# Patient Record
Sex: FEMALE | Race: WHITE | NOT HISPANIC OR LATINO | URBAN - METROPOLITAN AREA
[De-identification: names, ages, dates, MRNs, and addresses within clinical notes are randomized per-mention and may not be internally consistent; named-entity substitution may affect disease eponyms.]

---

## 2018-09-07 ENCOUNTER — EMERGENCY (EMERGENCY)
Facility: HOSPITAL | Age: 48
LOS: 1 days | End: 2018-09-07
Payer: MEDICARE

## 2018-09-07 PROCEDURE — 99284 EMERGENCY DEPT VISIT MOD MDM: CPT

## 2020-01-31 ENCOUNTER — OUTPATIENT (OUTPATIENT)
Dept: OUTPATIENT SERVICES | Facility: HOSPITAL | Age: 50
LOS: 1 days | End: 2020-01-31

## 2020-03-11 ENCOUNTER — EMERGENCY (EMERGENCY)
Facility: HOSPITAL | Age: 50
LOS: 1 days | End: 2020-03-11
Admitting: EMERGENCY MEDICINE
Payer: MEDICARE

## 2020-03-11 PROCEDURE — 99283 EMERGENCY DEPT VISIT LOW MDM: CPT

## 2020-07-30 ENCOUNTER — OUTPATIENT (OUTPATIENT)
Dept: OUTPATIENT SERVICES | Facility: HOSPITAL | Age: 50
LOS: 1 days | End: 2020-07-30

## 2020-08-06 ENCOUNTER — RESULT REVIEW (OUTPATIENT)
Age: 50
End: 2020-08-06

## 2020-08-14 PROBLEM — Z00.00 ENCOUNTER FOR PREVENTIVE HEALTH EXAMINATION: Status: ACTIVE | Noted: 2020-08-14

## 2020-09-16 ENCOUNTER — APPOINTMENT (OUTPATIENT)
Dept: INTERNAL MEDICINE | Facility: CLINIC | Age: 50
End: 2020-09-16
Payer: COMMERCIAL

## 2020-09-16 ENCOUNTER — APPOINTMENT (OUTPATIENT)
Dept: INTERNAL MEDICINE | Facility: CLINIC | Age: 50
End: 2020-09-16

## 2020-09-16 VITALS — BODY MASS INDEX: 28.12 KG/M2 | WEIGHT: 175 LBS | HEIGHT: 66 IN

## 2020-09-16 PROCEDURE — 99204 OFFICE O/P NEW MOD 45 MIN: CPT

## 2020-09-16 NOTE — ASSESSMENT
[FreeTextEntry1] : #1  Status post thoracentesis for right pleural effusion in early August.  No history of recurrence.  Pt lives in a group home and is followed by Dr. Pickering ([phone]) there.  If she develops any symptoms or any signs suggestive of a possible pleural effusion she will have a repeat chest x-ray. Otherwise if she continues to do well, she will return for a following appointment in 3 months.\par \par #2 Dementia.\par \par #3 Down's syndrome.\par \par #4 Myclonic seizure d/o.

## 2020-09-16 NOTE — HISTORY OF PRESENT ILLNESS
[TextBox_4] : The first pulmonary appointment for this 50-year-old female with a history of Down syndrome, myoclonic seizure disorder, and dementia.  She is here today with her mother.  The patient has a history of having a seizure and fell hit her head and was admitted to [facility] in early August.  She was noted to have a right pleural effusion which was drained.  This was clear yellow with cytology negative.  A chest x-ray after the thoracentesis showed resolution of the fluid.  Patient is not having any coughing, wheezing, or shortness of breath.  No fever or chills.  He is a non-smoker.

## 2020-09-16 NOTE — REVIEW OF SYSTEMS
[Fever] : no fever [Chills] : no chills [Cough] : no cough [Sputum] : no sputum [Wheezing] : no wheezing [SOB on Exertion] : no sob on exertion [Negative] : Endocrine [TextBox_122] : see above

## 2020-09-16 NOTE — PHYSICAL EXAM
[Normal Appearance] : normal appearance [Normal Oropharynx] : normal oropharynx [No Acute Distress] : no acute distress [No Neck Mass] : no neck mass [Normal Rate/Rhythm] : normal rate/rhythm [No Murmurs] : no murmurs [Normal S1, S2] : normal s1, s2 [No Resp Distress] : no resp distress [Clear to Auscultation Bilaterally] : clear to auscultation bilaterally [Benign] : benign [No Abnormalities] : no abnormalities [No Clubbing] : no clubbing [Normal Gait] : normal gait [No Cyanosis] : no cyanosis [No Edema] : no edema [FROM] : FROM [Normal Color/ Pigmentation] : normal color/ pigmentation [TextBox_132] : In WC

## 2020-12-14 ENCOUNTER — APPOINTMENT (OUTPATIENT)
Dept: INTERNAL MEDICINE | Facility: CLINIC | Age: 50
End: 2020-12-14
Payer: MEDICARE

## 2020-12-14 VITALS
WEIGHT: 180 LBS | OXYGEN SATURATION: 94 % | HEART RATE: 148 BPM | RESPIRATION RATE: 18 BRPM | HEIGHT: 70 IN | BODY MASS INDEX: 25.77 KG/M2 | TEMPERATURE: 96.8 F

## 2020-12-14 PROCEDURE — 99214 OFFICE O/P EST MOD 30 MIN: CPT

## 2020-12-14 NOTE — ASSESSMENT
[FreeTextEntry1] : #1  Status post right pleural effusion in August 2020.  No history of recurrence.  Patient lives in a group home.  and is followed by Dr. Pickering there.  Id she has signs or symptoms of a possible recurrent pleural effusion, she will have a repeat chest x-ray.  Otherwise, plan for following pulmonary appointment in 1 year.

## 2020-12-14 NOTE — HISTORY OF PRESENT ILLNESS
[TextBox_4] : This is a return pulmonary appointment for this 50-year-old female who is accompanied by her aide today.  Patient has a history of Down syndrome, dementia, hypothyroidism.  She was admitted to [facility] in August of this year after falling and hitting her head.  She was noted to have a right pleural effusion.  This was drained and was yellow and clear, cytology was negative.  Chest x-ray after showed no pleural effusion.\par \par The patient is not noted to have coughing, wheezing, shortness of breath, or sputum production.  Not having fever or chills.

## 2020-12-14 NOTE — PHYSICAL EXAM
[No Acute Distress] : no acute distress [Normal Oropharynx] : normal oropharynx [Normal Appearance] : normal appearance [No Neck Mass] : no neck mass [Normal Rate/Rhythm] : normal rate/rhythm [Normal S1, S2] : normal s1, s2 [No Resp Distress] : no resp distress [Clear to Auscultation Bilaterally] : clear to auscultation bilaterally [No Abnormalities] : no abnormalities [Benign] : benign [Normal Gait] : normal gait [No Clubbing] : no clubbing [No Cyanosis] : no cyanosis [No Edema] : no edema [Normal Color/ Pigmentation] : normal color/ pigmentation [No Focal Deficits] : no focal deficits [Oriented x3] : oriented x3 [Normal Affect] : normal affect [TextBox_54] : HR 80

## 2021-08-25 ENCOUNTER — APPOINTMENT (OUTPATIENT)
Dept: INTERNAL MEDICINE | Facility: CLINIC | Age: 51
End: 2021-08-25
Payer: MEDICARE

## 2021-08-25 VITALS
RESPIRATION RATE: 18 BRPM | BODY MASS INDEX: 25.77 KG/M2 | WEIGHT: 180 LBS | OXYGEN SATURATION: 99 % | SYSTOLIC BLOOD PRESSURE: 110 MMHG | HEIGHT: 70 IN | HEART RATE: 68 BPM | DIASTOLIC BLOOD PRESSURE: 60 MMHG | TEMPERATURE: 96.9 F

## 2021-08-25 PROCEDURE — 99214 OFFICE O/P EST MOD 30 MIN: CPT

## 2021-08-25 NOTE — ASSESSMENT
[FreeTextEntry1] : #1  History of right pleural effusion August 2020.  This was drained at that time.  Fluid was clear yellow and cytology was negative.  Chest x-ray after showed resolution of the effusion.  Patient lives in a group home and is followed by Dr. Pickering there.  If she has symptoms or signs of a possible recurrent effusion, she will have a repeat chest x-ray.  Otherwise, she will return for following appointment in 1 year.

## 2021-08-25 NOTE — HISTORY OF PRESENT ILLNESS
[TextBox_4] : This is a return visit for this 51-year-old female who is accompanied by her aide Elsa today.  She lives in a group home.  She has a history of Down's syndrome, dementia, seizure disorder, hypothyroidism.  She had a right pleural effusion that was drained August, 2020.  There is no history of known recurrence.  She is not having recent coughing, wheezing, shortness of breath, fever, or chills.

## 2021-08-25 NOTE — PHYSICAL EXAM
[No Acute Distress] : no acute distress [Normal Appearance] : normal appearance [No Neck Mass] : no neck mass [Normal Rate/Rhythm] : normal rate/rhythm [Normal S1, S2] : normal s1, s2 [No Murmurs] : no murmurs [No Resp Distress] : no resp distress [Clear to Auscultation Bilaterally] : clear to auscultation bilaterally [No Abnormalities] : no abnormalities [Benign] : benign [No Clubbing] : no clubbing [No Cyanosis] : no cyanosis [No Edema] : no edema [Normal Color/ Pigmentation] : normal color/ pigmentation

## 2021-10-07 ENCOUNTER — NON-APPOINTMENT (OUTPATIENT)
Age: 51
End: 2021-10-07

## 2021-10-07 ENCOUNTER — APPOINTMENT (OUTPATIENT)
Dept: OPHTHALMOLOGY | Facility: CLINIC | Age: 51
End: 2021-10-07
Payer: MEDICARE

## 2021-10-07 PROCEDURE — 99072 ADDL SUPL MATRL&STAF TM PHE: CPT

## 2021-10-07 PROCEDURE — 92014 COMPRE OPH EXAM EST PT 1/>: CPT

## 2022-06-07 ENCOUNTER — OUTPATIENT (OUTPATIENT)
Dept: OUTPATIENT SERVICES | Facility: HOSPITAL | Age: 52
LOS: 1 days | End: 2022-06-07

## 2022-06-07 DIAGNOSIS — J11.1 INFLUENZA DUE TO UNIDENTIFIED INFLUENZA VIRUS WITH OTHER RESPIRATORY MANIFESTATIONS: ICD-10-CM

## 2022-10-13 ENCOUNTER — NON-APPOINTMENT (OUTPATIENT)
Age: 52
End: 2022-10-13

## 2022-10-13 ENCOUNTER — APPOINTMENT (OUTPATIENT)
Dept: OPHTHALMOLOGY | Facility: CLINIC | Age: 52
End: 2022-10-13

## 2022-10-13 PROCEDURE — 92014 COMPRE OPH EXAM EST PT 1/>: CPT

## 2022-11-30 ENCOUNTER — APPOINTMENT (OUTPATIENT)
Dept: INTERNAL MEDICINE | Facility: CLINIC | Age: 52
End: 2022-11-30

## 2022-11-30 VITALS
BODY MASS INDEX: 20.04 KG/M2 | OXYGEN SATURATION: 94 % | WEIGHT: 140 LBS | HEIGHT: 70 IN | RESPIRATION RATE: 18 BRPM | HEART RATE: 74 BPM | DIASTOLIC BLOOD PRESSURE: 60 MMHG | SYSTOLIC BLOOD PRESSURE: 100 MMHG

## 2022-11-30 DIAGNOSIS — Q90.9 DOWN SYNDROME, UNSPECIFIED: ICD-10-CM

## 2022-11-30 PROCEDURE — 99214 OFFICE O/P EST MOD 30 MIN: CPT

## 2022-11-30 NOTE — ASSESSMENT
[FreeTextEntry1] : #1  52-year-old female with Down syndrome, dementia, seizure disorder, with history of right pleural effusion August 2020.  This was drained.  There is no history of recurrence.  She will return for a following pulmonary appointment in 1 year or at anytime on an as-needed basis.

## 2022-11-30 NOTE — HISTORY OF PRESENT ILLNESS
[TextBox_4] : Patient is accompanied by 2 people today who work at her residence..  \par \par This is a return pulmonary appointment for this 52-year-old female with a history of Down syndrome, dementia, seizure disorder, hypothyroidism.  She had a light right pleural effusion in August 2020 which was drained.  There is no history of recurrence.  Patient is not having coughing, wheezing, shortness of breath, fever, or chills.

## 2023-02-13 ENCOUNTER — APPOINTMENT (OUTPATIENT)
Dept: INTERNAL MEDICINE | Facility: CLINIC | Age: 53
End: 2023-02-13

## 2023-03-29 ENCOUNTER — OUTPATIENT (OUTPATIENT)
Dept: OUTPATIENT SERVICES | Facility: HOSPITAL | Age: 53
LOS: 1 days | End: 2023-03-29
Payer: MEDICARE

## 2023-03-29 VITALS
DIASTOLIC BLOOD PRESSURE: 70 MMHG | HEART RATE: 83 BPM | HEIGHT: 68 IN | OXYGEN SATURATION: 99 % | TEMPERATURE: 97 F | WEIGHT: 142.86 LBS | SYSTOLIC BLOOD PRESSURE: 106 MMHG | RESPIRATION RATE: 20 BRPM

## 2023-03-29 DIAGNOSIS — K02.9 DENTAL CARIES, UNSPECIFIED: ICD-10-CM

## 2023-03-29 DIAGNOSIS — K02.62 DENTAL CARIES ON SMOOTH SURFACE PENETRATING INTO DENTIN: ICD-10-CM

## 2023-03-29 DIAGNOSIS — K05.6 PERIODONTAL DISEASE, UNSPECIFIED: ICD-10-CM

## 2023-03-29 DIAGNOSIS — Z01.818 ENCOUNTER FOR OTHER PREPROCEDURAL EXAMINATION: ICD-10-CM

## 2023-03-29 DIAGNOSIS — Z93.1 GASTROSTOMY STATUS: Chronic | ICD-10-CM

## 2023-03-29 DIAGNOSIS — Z98.890 OTHER SPECIFIED POSTPROCEDURAL STATES: Chronic | ICD-10-CM

## 2023-03-29 LAB
ANION GAP SERPL CALC-SCNC: 11 MMOL/L — SIGNIFICANT CHANGE UP (ref 5–17)
BUN SERPL-MCNC: 20 MG/DL — SIGNIFICANT CHANGE UP (ref 7–23)
CALCIUM SERPL-MCNC: 10.1 MG/DL — SIGNIFICANT CHANGE UP (ref 8.4–10.5)
CHLORIDE SERPL-SCNC: 103 MMOL/L — SIGNIFICANT CHANGE UP (ref 96–108)
CO2 SERPL-SCNC: 29 MMOL/L — SIGNIFICANT CHANGE UP (ref 22–31)
CREAT SERPL-MCNC: 0.72 MG/DL — SIGNIFICANT CHANGE UP (ref 0.5–1.3)
EGFR: 99 ML/MIN/1.73M2 — SIGNIFICANT CHANGE UP
GLUCOSE SERPL-MCNC: 84 MG/DL — SIGNIFICANT CHANGE UP (ref 70–99)
HCT VFR BLD CALC: 37.6 % — SIGNIFICANT CHANGE UP (ref 34.5–45)
HGB BLD-MCNC: 11.8 G/DL — SIGNIFICANT CHANGE UP (ref 11.5–15.5)
MCHC RBC-ENTMCNC: 31.4 GM/DL — LOW (ref 32–36)
MCHC RBC-ENTMCNC: 32 PG — SIGNIFICANT CHANGE UP (ref 27–34)
MCV RBC AUTO: 101.9 FL — HIGH (ref 80–100)
NRBC # BLD: 0 /100 WBCS — SIGNIFICANT CHANGE UP (ref 0–0)
PLATELET # BLD AUTO: 219 K/UL — SIGNIFICANT CHANGE UP (ref 150–400)
POTASSIUM SERPL-MCNC: 4 MMOL/L — SIGNIFICANT CHANGE UP (ref 3.5–5.3)
POTASSIUM SERPL-SCNC: 4 MMOL/L — SIGNIFICANT CHANGE UP (ref 3.5–5.3)
RBC # BLD: 3.69 M/UL — LOW (ref 3.8–5.2)
RBC # FLD: 13.6 % — SIGNIFICANT CHANGE UP (ref 10.3–14.5)
SODIUM SERPL-SCNC: 143 MMOL/L — SIGNIFICANT CHANGE UP (ref 135–145)
WBC # BLD: 2.84 K/UL — LOW (ref 3.8–10.5)
WBC # FLD AUTO: 2.84 K/UL — LOW (ref 3.8–10.5)

## 2023-03-29 PROCEDURE — 71045 X-RAY EXAM CHEST 1 VIEW: CPT | Mod: 26

## 2023-03-29 PROCEDURE — G0463: CPT

## 2023-03-29 PROCEDURE — 71045 X-RAY EXAM CHEST 1 VIEW: CPT

## 2023-03-29 RX ORDER — LEVOTHYROXINE SODIUM 125 MCG
1 TABLET ORAL
Refills: 0 | DISCHARGE

## 2023-03-29 RX ORDER — LACOSAMIDE 50 MG/1
1 TABLET ORAL
Refills: 0 | DISCHARGE

## 2023-03-29 RX ORDER — SENNA PLUS 8.6 MG/1
0 TABLET ORAL
Refills: 0 | DISCHARGE

## 2023-03-29 RX ORDER — MODAFINIL 200 MG/1
1 TABLET ORAL
Refills: 0 | DISCHARGE

## 2023-03-29 RX ORDER — OMEPRAZOLE 10 MG/1
1 CAPSULE, DELAYED RELEASE ORAL
Refills: 0 | DISCHARGE

## 2023-03-29 RX ORDER — MIDAZOLAM HYDROCHLORIDE 1 MG/ML
1 INJECTION, SOLUTION INTRAMUSCULAR; INTRAVENOUS
Refills: 0 | DISCHARGE

## 2023-03-29 RX ORDER — MIDODRINE HYDROCHLORIDE 2.5 MG/1
1 TABLET ORAL
Refills: 0 | DISCHARGE

## 2023-03-29 RX ORDER — LAMOTRIGINE 25 MG/1
2.5 TABLET, ORALLY DISINTEGRATING ORAL
Refills: 0 | DISCHARGE

## 2023-03-29 NOTE — H&P PST ADULT - NSANTHOSAYNRD_GEN_A_CORE
per group home staff/No. JOYCE screening performed.  STOP BANG Legend: 0-2 = LOW Risk; 3-4 = INTERMEDIATE Risk; 5-8 = HIGH Risk

## 2023-03-29 NOTE — H&P PST ADULT - NSICDXPASTMEDICALHX_GEN_ALL_CORE_FT
PAST MEDICAL HISTORY:  At risk for aspiration     Down syndrome     Bradley catheter present     History of contracture of joint     History of pleural effusion     History of seizure disorder     Hypertension     Hypothyroidism     Involuntary jerky movements     Risk for falls     Wheelchair dependent

## 2023-03-29 NOTE — H&P PST ADULT - PROBLEM SELECTOR PLAN 1
Scheduled for comprehensive dental treatment under general anesthesia   preop instruction provided Scheduled for comprehensive dental treatment under general anesthesia   preop instruction provided  group home staff

## 2023-03-29 NOTE — H&P PST ADULT - HISTORY OF PRESENT ILLNESS
53 year old non verbal wheelchair bound female accompanied with group home staff, presents for preop testing for scheduled comprehensive dental treatment under general anesthesia on 04/19/2023. Her history significant for admission at [facility] 1/2023 for pericardial effusion s/p pericardiocentesis, sacral decubitus, urinary catheter in place x3 weeks (to help with decubitus healing per staff), Peg tube, h/o aspiration pneumonia in the past, Epilepsy (unknown last seizure episode), frequent jerky movement, hypothyroidism, HTN, down syndrome, upper and lower extremities contractures, cervical subluxation, intellectual disabilities, fall risk, head injury 53 year old non verbal wheelchair bound female accompanied with group home staff, presents for preop testing for scheduled comprehensive dental treatment under general anesthesia on 04/19/2023. Her history significant for admission at [facility] 1 for pericardial effusion s/p pericardiocentesis 1/2023, sacral decubitus, urinary catheter in place x3 weeks (to help with buttock wound healing per staff), Peg tube, h/o aspiration pneumonia in the past, Epilepsy (unknown last seizure episode), frequent jerky movement, hypothyroidism, HTN, down syndrome, upper and lower extremities contractures, cervical subluxation, intellectual disabilities, fall risk, head injury in the past.

## 2023-03-29 NOTE — H&P PST ADULT - RESPIRATORY
Medical Week 4 Survey      Responses   Facility patient discharged from?  [facility]   Does the patient have one of the following disease processes/diagnoses(primary or secondary)?  Other   Week 4 attempt successful?  No          Rabia Saini RN   no respiratory distress/good air movement

## 2023-03-29 NOTE — H&P PST ADULT - NSICDXPASTSURGICALHX_GEN_ALL_CORE_FT
PAST SURGICAL HISTORY:  PEG (percutaneous endoscopic gastrostomy) status     S/P pericardiocentesis

## 2023-03-29 NOTE — H&P PST ADULT - NS PRO AD ANY ON CHART
Received pt as transfer from [facility] this evening. Pt confused, disoriented. Able to follow minimal commands. Moves all extremities. Restless and attempting to pull at lines. Mittens applied per order.   SR on tele, RA during day  Ordoñez in place  Abx and IV No

## 2023-07-24 PROBLEM — Z87.39 PERSONAL HISTORY OF OTHER DISEASES OF THE MUSCULOSKELETAL SYSTEM AND CONNECTIVE TISSUE: Chronic | Status: ACTIVE | Noted: 2023-03-29

## 2023-07-24 PROBLEM — Z97.8 PRESENCE OF OTHER SPECIFIED DEVICES: Chronic | Status: ACTIVE | Noted: 2023-03-29

## 2023-07-24 PROBLEM — Z87.09 PERSONAL HISTORY OF OTHER DISEASES OF THE RESPIRATORY SYSTEM: Chronic | Status: ACTIVE | Noted: 2023-03-29

## 2023-07-24 PROBLEM — I10 ESSENTIAL (PRIMARY) HYPERTENSION: Chronic | Status: ACTIVE | Noted: 2023-03-29

## 2023-07-24 PROBLEM — Z91.81 HISTORY OF FALLING: Chronic | Status: ACTIVE | Noted: 2023-03-29

## 2023-07-24 PROBLEM — Z99.3 DEPENDENCE ON WHEELCHAIR: Chronic | Status: ACTIVE | Noted: 2023-03-29

## 2023-07-24 PROBLEM — Z86.69 PERSONAL HISTORY OF OTHER DISEASES OF THE NERVOUS SYSTEM AND SENSE ORGANS: Chronic | Status: ACTIVE | Noted: 2023-03-29

## 2023-07-24 PROBLEM — E03.9 HYPOTHYROIDISM, UNSPECIFIED: Chronic | Status: ACTIVE | Noted: 2023-03-29

## 2023-07-24 PROBLEM — Q90.9 DOWN SYNDROME, UNSPECIFIED: Chronic | Status: ACTIVE | Noted: 2023-03-29

## 2023-07-24 PROBLEM — Z91.89 OTHER SPECIFIED PERSONAL RISK FACTORS, NOT ELSEWHERE CLASSIFIED: Chronic | Status: ACTIVE | Noted: 2023-03-29

## 2023-07-24 PROBLEM — R25.8 OTHER ABNORMAL INVOLUNTARY MOVEMENTS: Chronic | Status: ACTIVE | Noted: 2023-03-29

## 2023-11-22 ENCOUNTER — NON-APPOINTMENT (OUTPATIENT)
Age: 53
End: 2023-11-22

## 2023-11-22 ENCOUNTER — APPOINTMENT (OUTPATIENT)
Dept: OPHTHALMOLOGY | Facility: CLINIC | Age: 53
End: 2023-11-22
Payer: MEDICARE

## 2023-11-22 PROCEDURE — 92004 COMPRE OPH EXAM NEW PT 1/>: CPT

## 2023-12-06 ENCOUNTER — APPOINTMENT (OUTPATIENT)
Dept: INTERNAL MEDICINE | Facility: CLINIC | Age: 53
End: 2023-12-06

## 2024-01-12 ENCOUNTER — APPOINTMENT (OUTPATIENT)
Dept: INTERNAL MEDICINE | Facility: CLINIC | Age: 54
End: 2024-01-12
Payer: MEDICARE

## 2024-01-12 VITALS
WEIGHT: 130 LBS | OXYGEN SATURATION: 94 % | HEART RATE: 67 BPM | RESPIRATION RATE: 16 BRPM | HEIGHT: 63 IN | DIASTOLIC BLOOD PRESSURE: 52 MMHG | BODY MASS INDEX: 23.04 KG/M2 | SYSTOLIC BLOOD PRESSURE: 118 MMHG

## 2024-01-12 DIAGNOSIS — J90 PLEURAL EFFUSION, NOT ELSEWHERE CLASSIFIED: ICD-10-CM

## 2024-01-12 DIAGNOSIS — F03.90 UNSPECIFIED DEMENTIA W/OUT BEHAVIORAL DISTURBANCE: ICD-10-CM

## 2024-01-12 DIAGNOSIS — G40.909 EPILEPSY, UNSPECIFIED, NOT INTRACTABLE, W/OUT STATUS EPILEPTICUS: ICD-10-CM

## 2024-01-12 DIAGNOSIS — Z78.9 OTHER SPECIFIED HEALTH STATUS: ICD-10-CM

## 2024-01-12 PROCEDURE — ZZZZZ: CPT

## 2024-01-12 PROCEDURE — 99214 OFFICE O/P EST MOD 30 MIN: CPT

## 2024-01-12 NOTE — PHYSICAL EXAM
[No Acute Distress] : no acute distress [Normal Oropharynx] : normal oropharynx [Normal Appearance] : normal appearance [No Neck Mass] : no neck mass [Normal Rate/Rhythm] : normal rate/rhythm [Normal S1, S2] : normal s1, s2 [No Resp Distress] : no resp distress [Clear to Auscultation Bilaterally] : clear to auscultation bilaterally [No Abnormalities] : no abnormalities [Benign] : benign [Normal Gait] : normal gait [No Clubbing] : no clubbing [No Cyanosis] : no cyanosis [No Edema] : no edema [Normal Color/ Pigmentation] : normal color/ pigmentation

## 2024-01-12 NOTE — HISTORY OF PRESENT ILLNESS
[TextBox_4] : The patient is accompanied by her aide today.  This is a return visit for this 53-year-old female with history of Down syndrome, dementia, seizure disorder, hypothyroidism.  She had a right pleural effusion in August 2020 which was drained.  There is no history of recurrence.  The patient is not coughing up blood or having shortness of breath.  She is not having fever.  She did have a CT angio chest scan on November 30, 2023 which did not show pulmonary embolism.  There was mild noted dependent atelectasis.

## 2024-01-12 NOTE — ASSESSMENT
[FreeTextEntry1] : #1  52-year-old female with Down syndrome, dementia, seizures disorder, with history of right pleural effusion August 2020.  This was drained.  There has been no recurrence.  The patient will return for following pulmonary appointment on an as-needed basis.

## 2024-01-17 ENCOUNTER — EMERGENCY (EMERGENCY)
Facility: HOSPITAL | Age: 54
LOS: 1 days | Discharge: HOME | End: 2024-01-17
Attending: EMERGENCY MEDICINE | Admitting: EMERGENCY MEDICINE
Payer: MEDICARE

## 2024-01-17 VITALS
DIASTOLIC BLOOD PRESSURE: 78 MMHG | SYSTOLIC BLOOD PRESSURE: 124 MMHG | TEMPERATURE: 98 F | HEART RATE: 86 BPM | OXYGEN SATURATION: 100 % | RESPIRATION RATE: 16 BRPM

## 2024-01-17 DIAGNOSIS — Z98.890 OTHER SPECIFIED POSTPROCEDURAL STATES: Chronic | ICD-10-CM

## 2024-01-17 DIAGNOSIS — Z93.1 GASTROSTOMY STATUS: Chronic | ICD-10-CM

## 2024-01-17 PROCEDURE — 99285 EMERGENCY DEPT VISIT HI MDM: CPT

## 2024-01-17 PROCEDURE — 99284 EMERGENCY DEPT VISIT MOD MDM: CPT | Mod: 25

## 2024-01-17 PROCEDURE — 99283 EMERGENCY DEPT VISIT LOW MDM: CPT

## 2024-01-17 PROCEDURE — 70450 CT HEAD/BRAIN W/O DYE: CPT | Mod: 26,MA,77

## 2024-01-17 PROCEDURE — 70450 CT HEAD/BRAIN W/O DYE: CPT | Mod: MA

## 2024-01-17 RX ORDER — LEVETIRACETAM 250 MG/1
500 TABLET, FILM COATED ORAL
Refills: 0 | Status: DISCONTINUED | OUTPATIENT
Start: 2024-01-17 | End: 2024-01-25

## 2024-01-17 RX ORDER — CEFPODOXIME PROXETIL 100 MG
1 TABLET ORAL
Qty: 14 | Refills: 0
Start: 2024-01-17 | End: 2024-01-23

## 2024-01-17 RX ORDER — LACOSAMIDE 50 MG/1
100 TABLET ORAL ONCE
Refills: 0 | Status: DISCONTINUED | OUTPATIENT
Start: 2024-01-17 | End: 2024-01-17

## 2024-01-17 RX ORDER — LEVETIRACETAM 250 MG/1
500 TABLET, FILM COATED ORAL
Refills: 0 | Status: DISCONTINUED | OUTPATIENT
Start: 2024-01-17 | End: 2024-01-17

## 2024-01-17 RX ORDER — CEFPODOXIME PROXETIL 100 MG
200 TABLET ORAL ONCE
Refills: 0 | Status: COMPLETED | OUTPATIENT
Start: 2024-01-17 | End: 2024-01-17

## 2024-01-17 RX ADMIN — Medication 200 MILLIGRAM(S): at 20:35

## 2024-01-17 RX ADMIN — LACOSAMIDE 100 MILLIGRAM(S): 50 TABLET ORAL at 20:36

## 2024-01-17 RX ADMIN — LEVETIRACETAM 500 MILLIGRAM(S): 250 TABLET, FILM COATED ORAL at 19:02

## 2024-01-17 NOTE — ED ADULT NURSE REASSESSMENT NOTE - NS ED NURSE REASSESS COMMENT FT1
Pt is resting in bed comfortably at this time, no apparent distress noted at this time. pt safety maintained. Pt denies any complaints at this time. pt updated on plan of care. Pt is nsr on cardiac monitor. Pt remain consistent with baseline mental status. VSS.

## 2024-01-17 NOTE — ED PROVIDER NOTE - CARE PROVIDER_API CALL
Bobby Robersonul  Neurosurgery  [address] [place] [id number]  Phone: [phone]  Fax: [phone]  Follow Up Time:

## 2024-01-17 NOTE — ED PROVIDER NOTE - OBJECTIVE STATEMENT
78 JIMMIE DOBBS is a 52yo Female with PMH developmental delay / non verbal who presents as transfer from [facility] where she was found to have ICH. EMS reports pt non verbal at baseline. ON arrival pt awake, alert. Not speaking but follows simple commands. Moving all four extremities.

## 2024-01-17 NOTE — ED PROVIDER NOTE - PATIENT PORTAL LINK FT
You can access the FollowMyHealth Patient Portal offered by [facility] by registering at the following website: [email or web address]. By joining [employer]’s FollowMyHealth portal, you will also be able to view your health information using other applications (apps) compatible with our system.

## 2024-01-17 NOTE — CONSULT NOTE ADULT - SUBJECTIVE AND OBJECTIVE BOX
HISTORY OF PRESENT ILLNESS:   53yF PMHx of developmental delay, nonverbal at baseline, presents [place] [facility] [place] being brought in from her group home.  at bedside and unsure why pt was sent in. Pt is nonverbal and moves very little at baseline. Pt did give non-verbal cues of being alert (tracks light with eyes, closes eyes tightly when light show in eyes).  reports this usual for her baseline. Wheelchair dependent, has a PEG.        PAST MEDICAL & SURGICAL HISTORY:  Unknown and unable to ascertain from patient given nonverbal   FAMILY HISTORY:  Unknown and unable to ascertain from patient given nonverbal     SOCIAL HISTORY:  Unknown and unable to ascertain from patient given nonverbal   Tobacco Use:  EtOH use:   Substance:    Allergies      Intolerances        REVIEW OF SYSTEMS  Negative except as noted in HPI      HOME MEDICATIONS:  Home Medications:      MEDICATIONS:  Antibiotics:    Neuro:    Anticoagulation:    OTHER:    IVF:      Vital Signs Last 24 Hrs  T(C): 36.7 (17 Jan 2024 16:01), Max: 36.7 (17 Jan 2024 16:01)  T(F): 98 (17 Jan 2024 16:01), Max: 98 (17 Jan 2024 16:01)  HR: 63 (17 Jan 2024 16:35) (63 - 86)  BP: 107/57 (17 Jan 2024 16:35) (107/57 - 124/78)  BP(mean): --  RR: 18 (17 Jan 2024 16:35) (16 - 18)  SpO2: 100% (17 Jan 2024 16:35) (100% - 100%)    Parameters below as of 17 Jan 2024 16:01  Patient On (Oxygen Delivery Method): room air          PHYSICAL EXAM:  GENERAL: NAD  HEAD:  Atraumatic, normocephalic  EYES: Conjunctiva and sclera clear  ENMT: Good dentition  ANDREWS COMA SCORE: E- V- M- = 8 (baseline) (E4,V1 (baseline), M4)       E: 4= opens eyes spontaneously 3= to voice 2= to noxious 1= no opening       V: 5= oriented 4= confused 3= inappropriate words 2= incomprehensible sounds 1= nonverbal 1T= intubated       M: 6= follows commands 5= localizes 4= withdraws 3= flexor posturing 2= extensor posturing 1= no movement  MENTAL STATUS: Awake; Opens eyes spontaneously; Nonverbal (baseline); Not following commands (baseline)  CRANIAL NERVES: PERRL. Face grossly symmetric w/ eye closure. Unable to further assess given pt's baseline    REFLEXES: PERRL. +blink reflex b/l  MOTOR: strength withdraws b/l UEs, weakly withdraws b/l LEs   SENSATION: Unable to assess  CHEST/LUNG: Nonlabored on room air, no accessory muscle use  SKIN: Warm, dry; some scratches in upper chest     LABS:  Reviewed labs from [facility]          RADIOLOGY & ADDITIONAL STUDIES:    ACC: [id number] EXAM: CT BRAIN    PROCEDURE DATE: 01/17/2024  INTERPRETATION: CLINICAL INDICATION: Altered mental status    5mm axial sections of the brain were obtained from base to vertex, without the intravenous administration of contrast material. Coronal and sagittal computer generated reconstructed views are available.    Comparison is made with the prior CT of 9/21/2023.    There is marked atrophy with ventricular and sulcal prominence for the patient's age. There is a small left frontal parietal mixed density subdural hematoma which is larger compared to the previous exam and demonstrates new high density hemorrhage in the interval, measuring 13 mm in depth compared with the prior of 8.5 mm. There is no midline shift. There is mild effacement of the dilated left lateral ventricle. Bone window examination is unremarkable.      IMPRESSION: Marked atrophy for the patient's age. Mixed density left frontal parietal subdural hematoma with acute on chronic subdural hematoma with new hemorrhage compared with 9/21/2023. No midline shift.     HISTORY OF PRESENT ILLNESS:   53yF PMHx of developmental delay, nonverbal at baseline, [place] [facility] [place] group home.  at bedside and unsure why pt was sent in. Pt is nonverbal and moves very little at baseline. Pt did give non-verbal cues of being alert (tracks light with eyes, closes eyes tightly when light show in eyes).  reports this usual for her baseline. Wheelchair dependent, has a PEG. No known trauma. CTH showed L acute on chronic SDH. Pt was transferred to [facility] for further neurosurgical eval.     PAST MEDICAL & SURGICAL HISTORY:  Unknown and unable to ascertain from patient given nonverbal   FAMILY HISTORY:  Unknown and unable to ascertain from patient given nonverbal     SOCIAL HISTORY:  Unknown and unable to ascertain from patient given nonverbal   Tobacco Use:  EtOH use:   Substance:    Allergies      Intolerances        REVIEW OF SYSTEMS  Negative except as noted in HPI      HOME MEDICATIONS:  Home Medications:      MEDICATIONS:  Antibiotics:    Neuro:    Anticoagulation:    OTHER:    IVF:      Vital Signs Last 24 Hrs  T(C): 36.7 (17 Jan 2024 16:01), Max: 36.7 (17 Jan 2024 16:01)  T(F): 98 (17 Jan 2024 16:01), Max: 98 (17 Jan 2024 16:01)  HR: 63 (17 Jan 2024 16:35) (63 - 86)  BP: 107/57 (17 Jan 2024 16:35) (107/57 - 124/78)  BP(mean): --  RR: 18 (17 Jan 2024 16:35) (16 - 18)  SpO2: 100% (17 Jan 2024 16:35) (100% - 100%)    Parameters below as of 17 Jan 2024 16:01  Patient On (Oxygen Delivery Method): room air          PHYSICAL EXAM:  GENERAL: NAD  HEAD:  Atraumatic, normocephalic  EYES: Conjunctiva and sclera clear  ENMT: Good dentition  ANDREWS COMA SCORE: E- V- M- = 8 (baseline) (E4,V1 (baseline), M4)       E: 4= opens eyes spontaneously 3= to voice 2= to noxious 1= no opening       V: 5= oriented 4= confused 3= inappropriate words 2= incomprehensible sounds 1= nonverbal 1T= intubated       M: 6= follows commands 5= localizes 4= withdraws 3= flexor posturing 2= extensor posturing 1= no movement  MENTAL STATUS: Awake; Opens eyes spontaneously; Nonverbal (baseline); Not following commands (baseline)  CRANIAL NERVES: PERRL. Face grossly symmetric w/ eye closure. Unable to further assess given pt's baseline    REFLEXES: PERRL. +blink reflex b/l  MOTOR: strength withdraws b/l UEs, weakly withdraws b/l LEs   SENSATION: Unable to assess  CHEST/LUNG: Nonlabored on room air, no accessory muscle use  SKIN: Warm, dry; some scratches in upper chest     LABS:  [place] [facility]          RADIOLOGY & ADDITIONAL STUDIES:    ACC: [id number] EXAM: CT BRAIN    PROCEDURE DATE: 01/17/2024  INTERPRETATION: CLINICAL INDICATION: Altered mental status    5mm axial sections of the brain were obtained from base to vertex, without the intravenous administration of contrast material. Coronal and sagittal computer generated reconstructed views are available.    Comparison is made with the prior CT of 9/21/2023.    There is marked atrophy with ventricular and sulcal prominence for the patient's age. There is a small left frontal parietal mixed density subdural hematoma which is larger compared to the previous exam and demonstrates new high density hemorrhage in the interval, measuring 13 mm in depth compared with the prior of 8.5 mm. There is no midline shift. There is mild effacement of the dilated left lateral ventricle. Bone window examination is unremarkable.      IMPRESSION: Marked atrophy for the patient's age. Mixed density left frontal parietal subdural hematoma with acute on chronic subdural hematoma with new hemorrhage compared with 9/21/2023. No midline shift.     HISTORY OF PRESENT ILLNESS:   53yF PMHx of developmental delay, nonverbal at baseline, presents as transfer from [facility] after being brought in from her group home.  at bedside and unsure why pt was sent in. Pt is nonverbal and moves very little at baseline. Pt did give non-verbal cues of being alert (tracks light with eyes, closes eyes tightly when light show in eyes).  reports this usual for her baseline. Wheelchair dependent, has a PEG. No known trauma. CTH showed L acute on chronic SDH. Pt was transferred to [facility] for further neurosurgical eval.     PAST MEDICAL & SURGICAL HISTORY:  Unknown and unable to ascertain from patient given nonverbal   FAMILY HISTORY:  Unknown and unable to ascertain from patient given nonverbal     SOCIAL HISTORY:  Unknown and unable to ascertain from patient given nonverbal   Tobacco Use:  EtOH use:   Substance:    Allergies      Intolerances        REVIEW OF SYSTEMS  Negative except as noted in HPI      HOME MEDICATIONS:  Home Medications:      MEDICATIONS:  Antibiotics:    Neuro:    Anticoagulation:    OTHER:    IVF:      Vital Signs Last 24 Hrs  T(C): 36.7 (17 Jan 2024 16:01), Max: 36.7 (17 Jan 2024 16:01)  T(F): 98 (17 Jan 2024 16:01), Max: 98 (17 Jan 2024 16:01)  HR: 63 (17 Jan 2024 16:35) (63 - 86)  BP: 107/57 (17 Jan 2024 16:35) (107/57 - 124/78)  BP(mean): --  RR: 18 (17 Jan 2024 16:35) (16 - 18)  SpO2: 100% (17 Jan 2024 16:35) (100% - 100%)    Parameters below as of 17 Jan 2024 16:01  Patient On (Oxygen Delivery Method): room air          PHYSICAL EXAM:  GENERAL: NAD  HEAD:  Atraumatic, normocephalic  EYES: Conjunctiva and sclera clear  ENMT: Good dentition  MENTAL STATUS: Awake; Opens eyes spontaneously; Nonverbal (baseline); Not following commands (baseline)  CRANIAL NERVES: PERRL. Face grossly symmetric w/ eye closure. Unable to further assess given pt's baseline    REFLEXES: PERRL. +blink reflex b/l  MOTOR: strength some spontaneous movement in LUE otherwise withdraws b/l UEs, weakly withdraws b/l LEs   SENSATION: Unable to assess  CHEST/LUNG: Nonlabored on room air, no accessory muscle use  SKIN: Warm, dry; some scratches in upper chest     LABS:  Reviewed labs from [facility]          RADIOLOGY & ADDITIONAL STUDIES:    ACC: [id number] EXAM: CT BRAIN    PROCEDURE DATE: 01/17/2024  INTERPRETATION: CLINICAL INDICATION: Altered mental status    5mm axial sections of the brain were obtained from base to vertex, without the intravenous administration of contrast material. Coronal and sagittal computer generated reconstructed views are available.    Comparison is made with the prior CT of 9/21/2023.    There is marked atrophy with ventricular and sulcal prominence for the patient's age. There is a small left frontal parietal mixed density subdural hematoma which is larger compared to the previous exam and demonstrates new high density hemorrhage in the interval, measuring 13 mm in depth compared with the prior of 8.5 mm. There is no midline shift. There is mild effacement of the dilated left lateral ventricle. Bone window examination is unremarkable.      IMPRESSION: Marked atrophy for the patient's age. Mixed density left frontal parietal subdural hematoma with acute on chronic subdural hematoma with new hemorrhage compared with 9/21/2023. No midline shift.

## 2024-01-17 NOTE — ED PROVIDER NOTE - PROGRESS NOTE DETAILS
Basia: NS consulted. Pratima VAUGHN: Received patient in signout.  CT head unchanged from prior.  Patient cleared by neurosurgical team  For outpatient follow-up.  Discussed with patient's mother at bedside, patient is at her baseline.  Patient's parents and aide at bedside are comfortable with plan for discharge back to group home.  Return precautions given. Pratima VAUGHN: Reviewed [place] paperwork, patient positive for UTI.  Will start antibiotics.  Informed group home about UTI, informed parents at bedside as well.

## 2024-01-17 NOTE — ED PROVIDER NOTE - NSFOLLOWUPINSTRUCTIONS_ED_ALL_ED_FT
- Follow-up with Dr. Roberson (information above) in 4 weeks.  - Bring results with you to the appointment.   - Return to the Emergency Department for any new or worsening symptoms.    Subdural Hematoma  Cross-section of the brain with a close-up showing a collection of blood between the brain and its outer covering, or dura.  A subdural hematoma is a collection of blood between the brain and its outer covering (dura). As the amount of blood increases, pressure builds on the brain.    There are two types of subdural hematomas:  Acute. This type develops shortly after a hard, direct hit to the head and causes blood to collect very quickly. This is a medical emergency. If it is not diagnosed and treated quickly, it can lead to severe brain injury or death.  Chronic. This is when bleeding develops more slowly, over weeks or months. In some cases, this type does not cause symptoms.  What are the causes?  This condition is caused by bleeding from a broken blood vessel (hemorrhage). In most cases, this is because of a head injury, such as from a hard, direct hit.  Head injuries can be caused by:  Motor vehicle accidents.  Falls.  Assaults.  Sports injuries.  In rare cases, a hemorrhage can happen without a known cause, especially if you take blood thinners (anticoagulants).    What increases the risk?  This condition is more likely to develop in:  Older people.  Infants.  People who take blood thinners.  People who have head injuries.  People who abuse alcohol.  What are the signs or symptoms?  Symptoms of this condition can be mild, severe, or life-threatening, depending on the size of the hematoma.    Symptoms of this condition include:  Headache.  Nausea or vomiting.  Changes in vision, such as double vision or loss of vision.  Changes in speech or trouble understanding what people say.  Loss of balance or trouble walking.  Weakness, numbness, or tingling in the arms or legs, especially on one side of the body.  Seizures.  Change in personality.  Increased sleepiness.  Memory loss.  Loss of consciousness.  Coma.  Symptoms of acute subdural hematoma can develop over minutes or hours. Symptoms of chronic subdural hematoma may develop over weeks or months.    How is this diagnosed?  This condition is diagnosed based on:  A physical exam.  Tests of strength, reflexes, coordination, senses, manner of walking, and facial and eye movements (neurological exam).  Imaging tests, such as an MRI or CT scan.  How is this treated?  Treatment for this condition depends on the type of hematoma and how severe it is.    Treatment for acute hematoma may include:  Emergency surgery to drain blood or remove a blood clot.  Medicines that help the body get rid of excess fluids (diuretics). These may help reduce pressure in the brain.  Assisted breathing (ventilation).  Treatment for chronic hematoma may include:  Observation and bed rest at the hospital.  Surgery.  If you take blood thinners, you may need to stop taking them for a short time. You may also be given anti-seizure medicine.    Sometimes, no treatment is needed for chronic hematoma.    Follow these instructions at home:  Activity    Avoid situations where you could injure your head again, such as competitive sports, downhill snow sports, and horseback riding. Do not do these activities until your health care provider approves.  Wear protective gear, such as a helmet, when participating in activities such as biking or contact sports.  Always wear your seat belt when you are in a motor vehicle.  Rest as told by your health care provider. Rest helps the brain heal.  You may have to avoid lifting. Ask your health care provider how much you can safely lift.  Do not drive, ride a bike, or use machinery until your health care provider says that it is safe.  Avoid too much visual stimulation while recovering. This includes working on the computer, watching TV, and reading.  Try to avoid activities that cause physical or mental stress.  Return to your normal activities as told by your health care provider. Ask your health care provider what activities are safe for you.  Alcohol use    Do not drink alcohol if:  Your health care provider tells you not to drink.  You are pregnant, may be pregnant, or are planning to become pregnant.  If you drink alcohol:  Limit how much you have to:  0–1 drink a day for women.  0–2 drinks a day for men.  Know how much alcohol is in your drink. In the [place] one drink equals one 12 oz bottle of beer (355 mL), one 5 oz glass of wine (148 mL), or one 1½ oz glass of hard liquor (44 mL).  General instructions    Watch your symptoms and ask others to do the same. Recovery from brain injuries varies. Talk with your health care provider about what to expect.  Take over-the-counter and prescription medicines only as told by your health care provider. Do not take blood thinners or NSAIDs unless your health care provider approves. These include aspirin, ibuprofen, naproxen, and warfarin.  Keep your home environment safe to reduce the risk of falling.  Keep all follow-up visits. Your health care team may need to watch you over time to check for serious changes in your condition.  Where to find more information  [employer] [place]: www.biausa.org  Brain Trauma Foundation: www.braintrauma.org  Get help right away if:  You are taking blood thinners or have a bleeding disorder and fall or experience minor trauma to the head. If you take blood thinners, even a small injury can cause a subdural hematoma.  You develop any of these symptoms after a head injury:  Clear fluid draining from your nose or ears.  Nausea or vomiting.  Changes in speech or trouble understanding what people say.  Seizures.  Sleepiness or a decrease in alertness.  Double vision.  Numbness or inability to move in any part of your body.  Difficulty walking or poor coordination.  Difficulty thinking.  Confusion or forgetfulness.  Personality changes.  Irrational or aggressive behavior.  These symptoms may be an emergency. Get help right away. Call 911.  Do not wait to see if the symptoms will go away.  Do not drive yourself to the hospital.  Summary  A subdural hematoma is a collection of blood between the brain and its outer covering (dura).  Treatment for this condition depends on the type of subdural hematoma and how severe it is.  Symptoms can vary from mild to severe to life-threatening.  Watch your symptoms and ask others to do the same.  This information is not intended to replace advice given to you by your health care provider. Make sure you discuss any questions you have with your health care provider. - Follow-up with Dr. Roberson (information above) in 4 weeks.  - Bring results with you to the appointment.   - Take Vantin 200 mg twice per day through PEG tube for UTI.  - Return to the Emergency Department for any new or worsening symptoms.    Subdural Hematoma  Cross-section of the brain with a close-up showing a collection of blood between the brain and its outer covering, or dura.  A subdural hematoma is a collection of blood between the brain and its outer covering (dura). As the amount of blood increases, pressure builds on the brain.    There are two types of subdural hematomas:  Acute. This type develops shortly after a hard, direct hit to the head and causes blood to collect very quickly. This is a medical emergency. If it is not diagnosed and treated quickly, it can lead to severe brain injury or death.  Chronic. This is when bleeding develops more slowly, over weeks or months. In some cases, this type does not cause symptoms.  What are the causes?  This condition is caused by bleeding from a broken blood vessel (hemorrhage). In most cases, this is because of a head injury, such as from a hard, direct hit.  Head injuries can be caused by:  Motor vehicle accidents.  Falls.  Assaults.  Sports injuries.  In rare cases, a hemorrhage can happen without a known cause, especially if you take blood thinners (anticoagulants).    What increases the risk?  This condition is more likely to develop in:  Older people.  Infants.  People who take blood thinners.  People who have head injuries.  People who abuse alcohol.  What are the signs or symptoms?  Symptoms of this condition can be mild, severe, or life-threatening, depending on the size of the hematoma.    Symptoms of this condition include:  Headache.  Nausea or vomiting.  Changes in vision, such as double vision or loss of vision.  Changes in speech or trouble understanding what people say.  Loss of balance or trouble walking.  Weakness, numbness, or tingling in the arms or legs, especially on one side of the body.  Seizures.  Change in personality.  Increased sleepiness.  Memory loss.  Loss of consciousness.  Coma.  Symptoms of acute subdural hematoma can develop over minutes or hours. Symptoms of chronic subdural hematoma may develop over weeks or months.    How is this diagnosed?  This condition is diagnosed based on:  A physical exam.  Tests of strength, reflexes, coordination, senses, manner of walking, and facial and eye movements (neurological exam).  Imaging tests, such as an MRI or CT scan.  How is this treated?  Treatment for this condition depends on the type of hematoma and how severe it is.    Treatment for acute hematoma may include:  Emergency surgery to drain blood or remove a blood clot.  Medicines that help the body get rid of excess fluids (diuretics). These may help reduce pressure in the brain.  Assisted breathing (ventilation).  Treatment for chronic hematoma may include:  Observation and bed rest at the hospital.  Surgery.  If you take blood thinners, you may need to stop taking them for a short time. You may also be given anti-seizure medicine.    Sometimes, no treatment is needed for chronic hematoma.    Follow these instructions at home:  Activity    Avoid situations where you could injure your head again, such as competitive sports, downhill snow sports, and horseback riding. Do not do these activities until your health care provider approves.  Wear protective gear, such as a helmet, when participating in activities such as biking or contact sports.  Always wear your seat belt when you are in a motor vehicle.  Rest as told by your health care provider. Rest helps the brain heal.  You may have to avoid lifting. Ask your health care provider how much you can safely lift.  Do not drive, ride a bike, or use machinery until your health care provider says that it is safe.  Avoid too much visual stimulation while recovering. This includes working on the computer, watching TV, and reading.  Try to avoid activities that cause physical or mental stress.  Return to your normal activities as told by your health care provider. Ask your health care provider what activities are safe for you.  Alcohol use    Do not drink alcohol if:  Your health care provider tells you not to drink.  You are pregnant, may be pregnant, or are planning to become pregnant.  If you drink alcohol:  Limit how much you have to:  0–1 drink a day for women.  0–2 drinks a day for men.  Know how much alcohol is in your drink. In the [place] one drink equals one 12 oz bottle of beer (355 mL), one 5 oz glass of wine (148 mL), or one 1½ oz glass of hard liquor (44 mL).  General instructions    Watch your symptoms and ask others to do the same. Recovery from brain injuries varies. Talk with your health care provider about what to expect.  Take over-the-counter and prescription medicines only as told by your health care provider. Do not take blood thinners or NSAIDs unless your health care provider approves. These include aspirin, ibuprofen, naproxen, and warfarin.  Keep your home environment safe to reduce the risk of falling.  Keep all follow-up visits. Your health care team may need to watch you over time to check for serious changes in your condition.  Where to find more information  [employer] [place]: www.biausa.org  Brain Trauma Foundation: www.braintrauma.org  Get help right away if:  You are taking blood thinners or have a bleeding disorder and fall or experience minor trauma to the head. If you take blood thinners, even a small injury can cause a subdural hematoma.  You develop any of these symptoms after a head injury:  Clear fluid draining from your nose or ears.  Nausea or vomiting.  Changes in speech or trouble understanding what people say.  Seizures.  Sleepiness or a decrease in alertness.  Double vision.  Numbness or inability to move in any part of your body.  Difficulty walking or poor coordination.  Difficulty thinking.  Confusion or forgetfulness.  Personality changes.  Irrational or aggressive behavior.  These symptoms may be an emergency. Get help right away. Call 911.  Do not wait to see if the symptoms will go away.  Do not drive yourself to the hospital.  Summary  A subdural hematoma is a collection of blood between the brain and its outer covering (dura).  Treatment for this condition depends on the type of subdural hematoma and how severe it is.  Symptoms can vary from mild to severe to life-threatening.  Watch your symptoms and ask others to do the same.  This information is not intended to replace advice given to you by your health care provider. Make sure you discuss any questions you have with your health care provider.

## 2024-01-17 NOTE — CONSULT NOTE ADULT - ASSESSMENT
ASSESSMENT:  53yF PMHx of developmental delay, nonverbal at baseline, [place] [facility] [place] her group home.         PLAN:    -Neuro checks until repeat CTH  -Repeat CTH in 6 hrs (~6pm)  -STAT CTH for any changes in neuro exam  -Pain control prn, avoid oversedation  --160  -Keppra 500mg BID  -Hold all AC/AP, DVT chemoprophylaxis  -Further neurosurgical plan pending repeat imaging  -Discussed case with Dr. Roberson  ASSESSMENT:  53yF PMHx of developmental delay, nonverbal at baseline, presents as [place] [facility] [place] being brought [place] from her group home. CTH showed L acute on chronic SDH. Pt was transferred to [facility] for further neurosurgical eval.      PLAN:    -Neuro checks until repeat CTH  -Repeat CTH in 6 hrs (~6pm)  -STAT CTH for any changes in neuro exam  -Pain control prn, avoid oversedation  --160  -Keppra 500mg BID  -Hold all AC/AP, DVT chemoprophylaxis  -Further neurosurgical plan pending repeat imaging  -Discussed case with Dr. Roberson  ASSESSMENT:  53yF PMHx of developmental delay, nonverbal at baseline, [place] [facility] [place]. CTH showed L acute on chronic SDH. Pt was transferred to [facility] for further neurosurgical eval.      PLAN:    -Neuro checks until repeat CTH  -Repeat CTH in 6 hrs (~6pm)  -STAT CTH for any changes in neuro exam  -Pain control prn, avoid oversedation  --160  -Keppra 500mg BID  -Hold all AC/AP, DVT chemoprophylaxis  -If repeat CTH stable, okay to discharge from neurosurgical standpoint. Can f/u with Dr. Roberson in 4 weeks as an outpatient   -Discussed case with Dr. Roberson

## 2024-01-17 NOTE — ED PROVIDER NOTE - CARE PLAN
Principal Discharge DX:	SDH (subdural hematoma)   1 Principal Discharge DX:	SDH (subdural hematoma)  Secondary Diagnosis:	UTI (urinary tract infection)

## 2024-01-17 NOTE — ED PROVIDER NOTE - PHYSICAL EXAMINATION
Gen: Pale. NAD  Head: NC/AT  Neck: trachea midline  Resp:  No distress  Abd: soft, nd, nt  Ext: no deformities  Neuro:  Alert. Appears non focal  Skin:  Warm and dry as visualized

## 2024-01-17 NOTE — ED PROVIDER NOTE - ATTENDING CONTRIBUTION TO CARE
53F transfer from [facility] [place] for acute on chronic SDH, nonverbal at baseline, on exam here seems rousable and is able to give nonverbal cues that she is alert; though otherwise difficult to assess exam; will d/w accepting transfer service as patient appears stable currently; will follow up recs, dispo pending clinical course and recommendations

## 2024-01-17 NOTE — PHARMACOTHERAPY INTERVENTION NOTE - COMMENTS
Referenced outpatient MAR to obtain medication list. Patient on lacosamide 50 mG in the morning and 100 mG in the evening. Outpatient Medication Review updated.    HOME MEDICATIONS:  albuterol 2.5 mg/3 mL (0.083%) inhalation solution: 3 milliliter(s) by nebulizer every 4 hours as needed for  shortness of breath and/or wheezing (17 Jan 2024 19:10)  ascorbic acid 100 mg oral tablet: 1 tab(s) orally once a day (17 Jan 2024 19:10)  cholecalciferol 125 mcg (5000 intl units) oral tablet: 1 tab(s) orally once a day (17 Jan 2024 19:10)  colchicine 0.6 mg oral tablet: 1 tab(s) orally once a day (17 Jan 2024 19:10)  ferrous sulfate 220 mg/5 mL (44 mg/5 mL elemental iron) oral elixir: 7 milliliter(s) orally once a day (17 Jan 2024 19:10)  Fish Oil 1600 mg/5 mL oral liquid: 3 milliliter(s) orally once a day (17 Jan 2024 19:10)  furosemide 20 mg oral tablet: 1 tab(s) orally once a day (17 Jan 2024 19:10)  lacosamide 100 mg oral tablet: 1 tab(s) orally once a day (at bedtime) (17 Jan 2024 19:10)  lacosamide 50 mg oral tablet: 1 tab(s) orally once a day (in the morning) (17 Jan 2024 19:10)  lamoTRIgine 100 mg oral tablet: 2.5 tab(s) orally 2 times a day (17 Jan 2024 19:10)  levothyroxine 112 mcg (0.112 mg) oral tablet: 1 tab(s) orally once a day (17 Jan 2024 19:10)  midodrine 10 mg oral tablet: 1 tab(s) orally 3 times a day (17 Jan 2024 19:10)  Multiple Vitamins oral tablet: 1 tab(s) orally once a day (17 Jan 2024 19:10)  omeprazole 20 mg oral delayed release capsule: 1 cap(s) orally once a day (17 Jan 2024 19:10)  polyethylene glycol 3350 with electrolytes oral powder for reconstitution: 17 gram(s) orally once a day (17 Jan 2024 19:10)  potassium chloride 20 mEq/15 mL oral liquid: 7.5 milliliter(s) orally once a day (17 Jan 2024 19:10)  senna (sennosides) 8.6 mg oral tablet: 2 tab(s) orally once a day (17 Jan 2024 19:10)  thiamine 100 mg oral tablet: 1 tab(s) orally 2 times a week on Tues and Fri (17 Jan 2024 19:10)

## 2024-01-17 NOTE — ED ADULT NURSE NOTE - NSFALLRISKINTERV_ED_ALL_ED
Assistance OOB with selected safe patient handling equipment if applicable/Assistance with ambulation/Communicate fall risk and risk factors to all staff, patient, and family/Monitor gait and stability/Monitor for mental status changes and reorient to person, place, and time, as needed/Provide visual cue: yellow wristband, yellow gown, etc/Reinforce activity limits and safety measures with patient and family/Toileting schedule using arm’s reach rule for commode and bathroom/Use of alarms - bed, stretcher, chair and/or video monitoring/Call bell, personal items and telephone in reach/Instruct patient to call for assistance before getting out of bed/chair/stretcher/Non-slip footwear applied when patient is off stretcher/[place] to call system/Physically safe environment - no spills, clutter or unnecessary equipment/Purposeful Proactive Rounding/Room/bathroom lighting operational, light cord in reach

## 2024-01-17 NOTE — ED PROVIDER NOTE - CLINICAL SUMMARY MEDICAL DECISION MAKING FREE TEXT BOX
ASSESSMENT:   JIMMIE DOBBS is a 52yo F transferred from [facility] [place] for SDH noted earlier today on CT. Physical exam non contributory.     PLAN: Supportive care. Neuro monitoring. NS consult.

## 2024-01-17 NOTE — ED ADULT NURSE NOTE - CHIEF COMPLAINT QUOTE
pt transferred from [facility] [place] for eval of ICH. pt @ baseline mental status per ems

## 2024-01-17 NOTE — ED ADULT NURSE NOTE - OBJECTIVE STATEMENT
Pt arrives as a transfer from [facility] [place]. Pt is from [facility]. Pt was diagnosed with ICH. Pt is non verbal at baseline. Aide from [facility] is at the bedside. Aide states pt is acting appropriate for her baseline. Pt is following simple commands on assessment. Pt is nsr on cardiac monitor. Pt breathing unlabored on room air. VSS> [place] [facility] [place] [facility]. Pt was diagnosed with ICH. Pt has developmental delay, and is non verbal at baseline. Aide from [facility] is at the bedside. Aide states pt is acting appropriate for her baseline. Pt is following simple commands on assessment. Pt is nsr on cardiac monitor. Pt breathing unlabored on room air. VSS>

## 2024-01-17 NOTE — ED ADULT TRIAGE NOTE - CHIEF COMPLAINT QUOTE
[place] [facility] [place]CH. pt @ baseline mental status per ems

## 2024-01-18 VITALS — RESPIRATION RATE: 16 BRPM | OXYGEN SATURATION: 97 % | HEART RATE: 76 BPM

## 2024-01-22 RX ORDER — CHOLECALCIFEROL (VITAMIN D3) 125 MCG
1 CAPSULE ORAL
Refills: 0 | DISCHARGE

## 2024-01-22 RX ORDER — LAMOTRIGINE 25 MG/1
2.5 TABLET, ORALLY DISINTEGRATING ORAL
Refills: 0 | DISCHARGE

## 2024-01-22 RX ORDER — ALBUTEROL 90 UG/1
3 AEROSOL, METERED ORAL
Refills: 0 | DISCHARGE

## 2024-01-22 RX ORDER — FERROUS SULFATE 325(65) MG
7 TABLET ORAL
Refills: 0 | DISCHARGE

## 2024-01-22 RX ORDER — FUROSEMIDE 40 MG
1 TABLET ORAL
Refills: 0 | DISCHARGE

## 2024-01-22 RX ORDER — THIAMINE MONONITRATE (VIT B1) 100 MG
1 TABLET ORAL
Refills: 0 | DISCHARGE

## 2024-01-22 RX ORDER — SOD SULF/SODIUM/NAHCO3/KCL/PEG
17 SOLUTION, RECONSTITUTED, ORAL ORAL
Refills: 0 | DISCHARGE

## 2024-01-22 RX ORDER — MIDODRINE HYDROCHLORIDE 2.5 MG/1
1 TABLET ORAL
Refills: 0 | DISCHARGE

## 2024-01-22 RX ORDER — SENNA PLUS 8.6 MG/1
2 TABLET ORAL
Refills: 0 | DISCHARGE

## 2024-01-22 RX ORDER — ASCORBIC ACID 60 MG
1 TABLET,CHEWABLE ORAL
Refills: 0 | DISCHARGE

## 2024-01-22 RX ORDER — OMEGA-3 ACID ETHYL ESTERS 1 G
3 CAPSULE ORAL
Refills: 0 | DISCHARGE

## 2024-01-22 RX ORDER — LEVOTHYROXINE SODIUM 125 MCG
1 TABLET ORAL
Refills: 0 | DISCHARGE

## 2024-01-22 RX ORDER — POTASSIUM CHLORIDE 20 MEQ
7.5 PACKET (EA) ORAL
Refills: 0 | DISCHARGE

## 2024-01-22 RX ORDER — COLCHICINE 0.6 MG
1 TABLET ORAL
Refills: 0 | DISCHARGE

## 2024-01-22 RX ORDER — OMEPRAZOLE 10 MG/1
1 CAPSULE, DELAYED RELEASE ORAL
Refills: 0 | DISCHARGE

## 2024-01-22 RX ORDER — LACOSAMIDE 50 MG/1
1 TABLET ORAL
Refills: 0 | DISCHARGE

## 2024-02-14 ENCOUNTER — APPOINTMENT (OUTPATIENT)
Dept: NEUROSURGERY | Facility: CLINIC | Age: 54
End: 2024-02-14
Payer: MEDICARE

## 2024-02-14 VITALS
SYSTOLIC BLOOD PRESSURE: 147 MMHG | HEART RATE: 88 BPM | BODY MASS INDEX: 23.03 KG/M2 | WEIGHT: 130 LBS | DIASTOLIC BLOOD PRESSURE: 123 MMHG | OXYGEN SATURATION: 93 %

## 2024-02-14 PROCEDURE — 99204 OFFICE O/P NEW MOD 45 MIN: CPT

## 2024-02-14 NOTE — PLAN
[FreeTextEntry1] : 53F with PMH developmental delay, wheelchair bound and nonverbal at baseline who presents today for follow up of L SDH.  Plan: - Repeat CTH within 4 weeks - Telehealth visit after CTH performed

## 2024-02-14 NOTE — PHYSICAL EXAM
[Person] : disoriented to person [Place] : disoriented to place [Time] : disoriented to time [FreeTextEntry5] : eyes open spontaneously [FreeTextEntry6] : involuntary movements [FreeTextEntry8] : wheelchair bound  [Sclera] : the sclera and conjunctiva were normal [Neck Appearance] : the appearance of the neck was normal [FreeTextEntry1] : cough [Abdominal Aorta] : the abdominal aorta was normal [Skin Color & Pigmentation] : normal skin color and pigmentation

## 2024-02-14 NOTE — HISTORY OF PRESENT ILLNESS
[de-identified] : 53F with extensive PMH including mental handicap, Down Syndrome , wheelchair bound and nonverbal at baseline presents today for follow up of L SDH. Patient was originally sent to [facility] on 1/17/24 for unknown reasons as patient seemed to be at neurologic baseline, but CTH was performed and showed chronic L SDH. Repeat CTH was stable and patient was discharged back to group home. Patient presents today with group home staff for follow up. Patient unable to provide ROS but aides report patient has remained at neurologic baseline.

## 2024-03-06 ENCOUNTER — APPOINTMENT (OUTPATIENT)
Dept: NEUROSURGERY | Facility: CLINIC | Age: 54
End: 2024-03-06
Payer: MEDICARE

## 2024-03-06 VITALS — OXYGEN SATURATION: 91 % | WEIGHT: 130.6 LBS | HEART RATE: 62 BPM | BODY MASS INDEX: 23.13 KG/M2

## 2024-03-06 DIAGNOSIS — S06.5XAA TRAUMATIC SUBDURAL HEMORRHAGE WITH LOSS OF CONSCIOUSNESS STATUS UNKNOWN, INITIAL ENCOUNTER: ICD-10-CM

## 2024-03-06 PROCEDURE — 99214 OFFICE O/P EST MOD 30 MIN: CPT

## 2024-03-06 NOTE — HISTORY OF PRESENT ILLNESS
[de-identified] : 53F with extensive PMH including mental handicap, Down Syndrome , wheelchair bound and nonverbal at baseline presents today for follow up of L SDH. Patient was originally sent to [facility] on 1/17/24 for unknown reasons as patient seemed to be at neurologic baseline, but CTH was performed and showed chronic L SDH. Repeat CTH was stable and patient was discharged back to group home. Patient presents today with group home staff for follow up. Patient unable to provide ROS but aides report patient has remained at neurologic baseline.

## 2024-03-06 NOTE — PLAN
[FreeTextEntry1] : 53F with PMH developmental delay, wheelchair bound and nonverbal at baseline who presents today for follow up of L SDH. Ct head reveals interval improvement of left SDH.    Plan: - Patient may follow as needed -Written instructions of plan provided to facility and discussed with aide at patient side.    I, Bobby Roberson, personally performed the evaluation and management (E/M) services for this established patient who presents today. That E/M includes conducting the clinically appropriate interval history &/or exam and establishing a continued plan of care. Today, my OCTAVIANO, Sugey Multani, was here to observe my evaluation and management service for this patient and follow the plan of care established by me going forward.

## 2024-03-06 NOTE — PHYSICAL EXAM
[Sclera] : the sclera and conjunctiva were normal [Neck Appearance] : the appearance of the neck was normal [Abdominal Aorta] : the abdominal aorta was normal [Skin Color & Pigmentation] : normal skin color and pigmentation [Person] : disoriented to person [Place] : disoriented to place [Time] : disoriented to time [FreeTextEntry5] : eyes open spontaneously [FreeTextEntry6] : involuntary movements [FreeTextEntry8] : wheelchair bound  [FreeTextEntry1] : cough

## 2024-03-06 NOTE — END OF VISIT
[Time Spent: ___ minutes] : I have spent [unfilled] minutes of time on the encounter. [FreeTextEntry3] : There is interval improvement of her left-sided chronic subdural hematoma.  The patient is at her neurological baseline.  She may follow-up as needed.  There is no further imaging needed at this time.

## 2024-03-06 NOTE — DATA REVIEWED
[de-identified] : CC: [id number] EXAM: CAT 1009 _ [place] [id number]   PROCEDURE DATE: Mar 1 2024   .  CLINICAL INFORMATION: Traumatic subdural hematoma with loss of consciousness.  TECHNIQUE: Multiple axial CT images of the head were obtained without contrast. Sagittal and coronal reconstructed images were acquired from the source data.  COMPARISON: Prior CT study of the head from 1/19/2024.  FINDINGS: Previously seen mixed density left-sided convexity subdural hematoma has decreased in size in comparison to the prior CT exam. The greatest transverse depth along the left lateral frontal convexity currently measures up to 5.4 mm, appears a measuring up to 1.2 cm. There is improved mass effect upon the left cerebral hemisphere. Previously seen thin right frontal convexity subdural hematoma has nearly completely resolved. There is no significant shift of midline structures. There is no herniation.  No new areas of acute intracranial hemorrhage are seen.  Extensive diffuse cerebral volume loss with relative sparing of the cerebellar hemispheres is again seen. Severe ventriculomegaly also appears unchanged.  The paranasal sinuses and tympanomastoid cavities are clear. The calvarium appears intact. The orbits appear unremarkable.  IMPRESSION: Interval decrease in size of the left sided convexity mixed density subdural hematoma, as discussed. Interval near complete resolution of the previously noted thin right frontal convexity subdural hemorrhage.  Other multiple unchanged chronic findings, as discussed.

## 2024-03-06 NOTE — DATA REVIEWED
[de-identified] : ACC: [id number] EXAM: CAT 1009 _ [place] [id number]   PROCEDURE DATE: Mar 1 2024   .  CLINICAL INFORMATION: Traumatic subdural hematoma with loss of consciousness.  TECHNIQUE: Multiple axial CT images of the head were obtained without contrast. Sagittal and coronal reconstructed images were acquired from the source data.  COMPARISON: Prior CT study of the head from 1/19/2024.  FINDINGS: Previously seen mixed density left-sided convexity subdural hematoma has decreased in size in comparison to the prior CT exam. The greatest transverse depth along the left lateral frontal convexity currently measures up to 5.4 mm, appears a measuring up to 1.2 cm. There is improved mass effect upon the left cerebral hemisphere. Previously seen thin right frontal convexity subdural hematoma has nearly completely resolved. There is no significant shift of midline structures. There is no herniation.  No new areas of acute intracranial hemorrhage are seen.  Extensive diffuse cerebral volume loss with relative sparing of the cerebellar hemispheres is again seen. Severe ventriculomegaly also appears unchanged.  The paranasal sinuses and tympanomastoid cavities are clear. The calvarium appears intact. The orbits appear unremarkable.  IMPRESSION: Interval decrease in size of the left sided convexity mixed density subdural hematoma, as discussed. Interval near complete resolution of the previously noted thin right frontal convexity subdural hemorrhage.  Other multiple unchanged chronic findings, as discussed.

## 2024-03-06 NOTE — HISTORY OF PRESENT ILLNESS
[de-identified] : 53F with extensive PMH including mental handicap, Down Syndrome , wheelchair bound and nonverbal at baseline presents today for follow up of L SDH. Patient was originally sent to [facility] on 1/17/24 for unknown reasons as patient seemed to be at neurologic baseline, but CTH was performed and showed chronic L SDH. Repeat CTH was stable and patient was discharged back to group home. Patient presents today with group home staff for follow up. Patient unable to provide ROS but aides report patient has remained at neurologic baseline.

## 2024-03-06 NOTE — PHYSICAL EXAM
[Sclera] : the sclera and conjunctiva were normal [Neck Appearance] : the appearance of the neck was normal [Abdominal Aorta] : the abdominal aorta was normal [Skin Color & Pigmentation] : normal skin color and pigmentation [Person] : disoriented to person [Place] : disoriented to place [Time] : disoriented to time [FreeTextEntry6] : involuntary movements [FreeTextEntry5] : eyes open spontaneously [FreeTextEntry8] : wheelchair bound  [FreeTextEntry1] : cough

## 2024-04-15 ENCOUNTER — TRANSCRIPTION ENCOUNTER (OUTPATIENT)
Age: 54
End: 2024-04-15

## 2024-05-07 ENCOUNTER — NON-APPOINTMENT (OUTPATIENT)
Age: 54
End: 2024-05-07

## 2024-05-07 DIAGNOSIS — I70.203 UNSPECIFIED ATHEROSCLEROSIS OF NATIVE ARTERIES OF EXTREMITIES, BILATERAL LEGS: ICD-10-CM

## 2024-05-07 DIAGNOSIS — M79.675 PAIN IN RIGHT TOE(S): ICD-10-CM

## 2024-05-07 DIAGNOSIS — M79.674 PAIN IN RIGHT TOE(S): ICD-10-CM

## 2024-05-07 DIAGNOSIS — B35.1 TINEA UNGUIUM: ICD-10-CM

## 2024-09-30 ENCOUNTER — NON-APPOINTMENT (OUTPATIENT)
Age: 54
End: 2024-09-30

## 2024-10-07 ENCOUNTER — APPOINTMENT (OUTPATIENT)
Dept: INTERNAL MEDICINE | Facility: CLINIC | Age: 54
End: 2024-10-07

## 2024-10-14 ENCOUNTER — APPOINTMENT (OUTPATIENT)
Dept: PULMONOLOGY | Facility: CLINIC | Age: 54
End: 2024-10-14
Payer: MEDICARE

## 2024-10-14 VITALS
SYSTOLIC BLOOD PRESSURE: 100 MMHG | HEART RATE: 120 BPM | TEMPERATURE: 98 F | OXYGEN SATURATION: 94 % | DIASTOLIC BLOOD PRESSURE: 60 MMHG

## 2024-10-14 DIAGNOSIS — R04.2 HEMOPTYSIS: ICD-10-CM

## 2024-10-14 DIAGNOSIS — R06.81 APNEA, NOT ELSEWHERE CLASSIFIED: ICD-10-CM

## 2024-10-14 DIAGNOSIS — J45.20 MILD INTERMITTENT ASTHMA, UNCOMPLICATED: ICD-10-CM

## 2024-10-14 DIAGNOSIS — R06.83 SNORING: ICD-10-CM

## 2024-10-14 DIAGNOSIS — R06.02 SHORTNESS OF BREATH: ICD-10-CM

## 2024-10-14 DIAGNOSIS — G47.36 SLEEP RELATED HYPOVENTILATION IN CONDITIONS CLASSIFIED ELSEWHERE: ICD-10-CM

## 2024-10-14 DIAGNOSIS — J98.11 ATELECTASIS: ICD-10-CM

## 2024-10-14 PROCEDURE — 99205 OFFICE O/P NEW HI 60 MIN: CPT

## 2024-10-14 RX ORDER — LANOLIN ALCOHOL/MO/W.PET/CERES
500 CREAM (GRAM) TOPICAL
Refills: 0 | Status: ACTIVE | COMMUNITY

## 2024-10-14 RX ORDER — FAMOTIDINE 40 MG/5ML
40 POWDER, FOR SUSPENSION ORAL
Refills: 0 | Status: ACTIVE | COMMUNITY

## 2024-10-14 RX ORDER — NUTRITIONAL SUPPLEMENT/FIBER 0.05 G-1.5
LIQUID (ML) ORAL
Refills: 0 | Status: ACTIVE | COMMUNITY

## 2024-10-14 RX ORDER — LEVOTHYROXINE SODIUM 0.12 MG/1
125 TABLET ORAL
Refills: 0 | Status: ACTIVE | COMMUNITY

## 2024-10-14 RX ORDER — AZELASTINE HYDROCHLORIDE 137 UG/1
0.1 SPRAY, METERED NASAL
Refills: 0 | Status: ACTIVE | COMMUNITY

## 2024-10-14 RX ORDER — BUDESONIDE AND FORMOTEROL FUMARATE DIHYDRATE 160; 4.5 UG/1; UG/1
AEROSOL RESPIRATORY (INHALATION)
Refills: 0 | Status: ACTIVE | COMMUNITY

## 2024-10-14 RX ORDER — POLYETHYLENE GLYCOL 3350 17 G/17G
17 POWDER, FOR SOLUTION ORAL
Refills: 0 | Status: ACTIVE | COMMUNITY

## 2024-10-14 RX ORDER — IPRATROPIUM BROMIDE AND ALBUTEROL SULFATE 2.5; .5 MG/3ML; MG/3ML
0.5-2.5 (3) SOLUTION RESPIRATORY (INHALATION)
Qty: 30 | Refills: 0 | Status: ACTIVE | COMMUNITY
Start: 2024-10-14 | End: 1900-01-01

## 2024-10-14 RX ORDER — VITAMIN K2 90 MCG
125 MCG CAPSULE ORAL
Refills: 0 | Status: ACTIVE | COMMUNITY

## 2024-10-14 RX ORDER — LAMOTRIGINE 100 MG/1
100 TABLET ORAL
Refills: 0 | Status: ACTIVE | COMMUNITY

## 2024-10-14 RX ORDER — ROSUVASTATIN CALCIUM 20 MG/1
20 TABLET, FILM COATED ORAL
Refills: 0 | Status: ACTIVE | COMMUNITY

## 2024-10-14 RX ORDER — POTASSIUM CHLORIDE 1.3 MEQ/ML
20 MEQ/15ML SOLUTION ORAL
Refills: 0 | Status: ACTIVE | COMMUNITY

## 2024-10-14 RX ORDER — SENNOSIDES 8.6 MG/1
8.6 CAPSULE, GELATIN COATED ORAL
Refills: 0 | Status: ACTIVE | COMMUNITY

## 2024-10-14 RX ORDER — BACILLUS COAGULANS 1B CELL
CAPSULE ORAL
Refills: 0 | Status: ACTIVE | COMMUNITY

## 2024-10-14 RX ORDER — PURIFIED WATER 99.03 ML/100ML
SOLUTION/ DROPS OPHTHALMIC
Refills: 0 | Status: ACTIVE | COMMUNITY

## 2024-10-14 RX ORDER — ARGIN/GLUT/CAHMB/COLLAG/MV-MIN 7G-7G-1.5G
POWDER IN PACKET (EA) ORAL
Refills: 0 | Status: ACTIVE | COMMUNITY

## 2024-10-14 RX ORDER — FUROSEMIDE 20 MG/1
20 TABLET ORAL
Refills: 0 | Status: ACTIVE | COMMUNITY

## 2024-10-14 RX ORDER — THIAMINE HCL 100 MG
100 TABLET ORAL
Refills: 0 | Status: ACTIVE | COMMUNITY

## 2024-10-15 PROBLEM — R04.2 COUGH WITH HEMOPTYSIS: Status: ACTIVE | Noted: 2024-10-15

## 2024-10-15 PROBLEM — G47.36 SLEEP-RELATED HYPOVENTILATION DUE TO MEDICAL CONDITION: Status: ACTIVE | Noted: 2024-10-15

## 2024-10-15 PROBLEM — R06.02 SHORTNESS OF BREATH: Status: ACTIVE | Noted: 2024-10-15

## 2024-10-15 PROBLEM — R06.83 SNORING: Status: ACTIVE | Noted: 2024-10-15

## 2024-10-16 ENCOUNTER — NON-APPOINTMENT (OUTPATIENT)
Age: 54
End: 2024-10-16

## 2024-11-18 ENCOUNTER — APPOINTMENT (OUTPATIENT)
Dept: OPHTHALMOLOGY | Facility: CLINIC | Age: 54
End: 2024-11-18

## 2024-12-12 ENCOUNTER — APPOINTMENT (OUTPATIENT)
Dept: PULMONOLOGY | Facility: CLINIC | Age: 54
End: 2024-12-12

## 2024-12-17 ENCOUNTER — APPOINTMENT (OUTPATIENT)
Dept: PULMONOLOGY | Facility: CLINIC | Age: 54
End: 2024-12-17

## 2024-12-31 ENCOUNTER — NON-APPOINTMENT (OUTPATIENT)
Age: 54
End: 2024-12-31